# Patient Record
Sex: FEMALE | Race: WHITE | NOT HISPANIC OR LATINO | Employment: OTHER | URBAN - METROPOLITAN AREA
[De-identification: names, ages, dates, MRNs, and addresses within clinical notes are randomized per-mention and may not be internally consistent; named-entity substitution may affect disease eponyms.]

---

## 2021-02-15 ENCOUNTER — TRANSCRIBE ORDERS (OUTPATIENT)
Dept: ADMINISTRATIVE | Facility: HOSPITAL | Age: 71
End: 2021-02-15

## 2021-02-15 ENCOUNTER — APPOINTMENT (OUTPATIENT)
Dept: RADIOLOGY | Facility: CLINIC | Age: 71
End: 2021-02-15
Payer: MEDICARE

## 2021-02-15 DIAGNOSIS — M54.50 LOW BACK PAIN, UNSPECIFIED BACK PAIN LATERALITY, UNSPECIFIED CHRONICITY, UNSPECIFIED WHETHER SCIATICA PRESENT: ICD-10-CM

## 2021-02-15 DIAGNOSIS — M54.50 LOW BACK PAIN, UNSPECIFIED BACK PAIN LATERALITY, UNSPECIFIED CHRONICITY, UNSPECIFIED WHETHER SCIATICA PRESENT: Primary | ICD-10-CM

## 2021-02-15 PROCEDURE — 72100 X-RAY EXAM L-S SPINE 2/3 VWS: CPT

## 2021-02-23 ENCOUNTER — CONSULT (OUTPATIENT)
Dept: PAIN MEDICINE | Facility: CLINIC | Age: 71
End: 2021-02-23
Payer: MEDICARE

## 2021-02-23 VITALS
DIASTOLIC BLOOD PRESSURE: 80 MMHG | TEMPERATURE: 97.9 F | HEIGHT: 62 IN | BODY MASS INDEX: 21.97 KG/M2 | SYSTOLIC BLOOD PRESSURE: 136 MMHG | WEIGHT: 119.4 LBS | HEART RATE: 78 BPM

## 2021-02-23 DIAGNOSIS — Z98.890 STATUS POST LUMBAR LAMINECTOMY: ICD-10-CM

## 2021-02-23 DIAGNOSIS — M54.16 LUMBAR RADICULOPATHY: Primary | ICD-10-CM

## 2021-02-23 PROCEDURE — 99204 OFFICE O/P NEW MOD 45 MIN: CPT | Performed by: ANESTHESIOLOGY

## 2021-02-23 RX ORDER — CYCLOBENZAPRINE HCL 5 MG
5 TABLET ORAL 3 TIMES DAILY PRN
COMMUNITY
Start: 2021-02-15

## 2021-02-23 RX ORDER — GABAPENTIN 100 MG/1
CAPSULE ORAL
Qty: 90 CAPSULE | Refills: 0 | Status: SHIPPED | OUTPATIENT
Start: 2021-02-23 | End: 2021-02-23 | Stop reason: SDUPTHER

## 2021-02-23 RX ORDER — GABAPENTIN 100 MG/1
CAPSULE ORAL
Qty: 90 CAPSULE | Refills: 0 | Status: SHIPPED | OUTPATIENT
Start: 2021-02-23

## 2021-02-23 NOTE — PATIENT INSTRUCTIONS
Gabapentin (By mouth)   Gabapentin (jannette-a-PEN-tin)  Treats seizures and pain caused by shingles  Brand Name(s): FusePaq Fanatrex, Gralise, Neurontin   There may be other brand names for this medicine  When This Medicine Should Not Be Used: This medicine is not right for everyone  Do not use it if you had an allergic reaction to gabapentin  How to Use This Medicine:   Capsule, Liquid, Tablet  · Take your medicine as directed  Your dose may need to be changed several times to find what works best for you  If you have epilepsy, do not allow more than 12 hours to pass between doses  · Capsule: Swallow the capsule whole with plenty of water  Do not open, crush, or chew it  · Gralise® tablet: Swallow the tablet whole   Do not crush, break, or chew it  · Neurontin® tablet: If you break a tablet into 2 pieces, use the second half as your next dose  Do not use the half-tablet if the whole tablet has been cut or broken after 28 days  · Oral liquid: Measure the oral liquid medicine with a marked measuring spoon, oral syringe, or medicine cup  · This medicine should come with a Medication Guide  Ask your pharmacist for a copy if you do not have one  · Missed dose: Take a dose as soon as you remember  If it is almost time for your next dose, wait until then and take a regular dose  Do not take extra medicine to make up for a missed dose  · Store the medicine in a closed container at room temperature, away from heat, moisture, and direct light  Store the Neurontin® oral liquid in the refrigerator  Do not freeze  Drugs and Foods to Avoid:   Ask your doctor or pharmacist before using any other medicine, including over-the-counter medicines, vitamins, and herbal products  · Some medicines can affect how gabapentin works  Tell your doctor if you also using hydrocodone or morphine  · If you take an antacid, wait at least 2 hours before you take gabapentin    · Do not drink alcohol while you are using this medicine  · Tell your doctor if you use anything else that makes you sleepy  Some examples are allergy medicine, narcotic pain medicine, and alcohol  Tell your doctor if you are also using lorazepam, oxycodone, or zolpidem  Warnings While Using This Medicine:   · Tell your doctor if you are pregnant or breastfeeding, or if you have kidney problems (including patients receiving dialysis) or lung problems  Tell your doctor if you have a history of depression or mental health problems  · This medicine may cause the following problems:  ? Drug reaction with eosinophilia and systemic symptoms (DRESS) or multiorgan hypersensitivity, which may damage the liver, kidney, blood, heart, or muscles  ? Changes in mood or behavior, including suicidal thoughts or behavior  ? Respiratory depression (serious breathing problem that can be life-threatening), when used with narcotic pain medicines  · Do not stop using this medicine suddenly  Your doctor will need to slowly decrease your dose before you stop it completely  · This medicine may make you dizzy or drowsy  Do not drive or do anything else that could be dangerous until you know how this medicine affects you  · Tell any doctor or dentist who treats you that you are using this medicine  This medicine may affect certain medical test results  · Your doctor will check your progress and the effects of this medicine at regular visits  Keep all appointments  · Keep all medicine out of the reach of children  Never share your medicine with anyone    Possible Side Effects While Using This Medicine:   Call your doctor right away if you notice any of these side effects:  · Allergic reaction: Itching or hives, swelling in your face or hands, swelling or tingling in your mouth or throat, chest tightness, trouble breathing  · Behavior problems, aggression, restlessness, trouble concentrating, moodiness (especially in children)  · Blistering, peeling, red skin rash  · Blue lips, fingernails, or skin, chest pain, fast heartbeat, trouble breathing  · Change in how much or how often you urinate, bloody or cloudy urine  · Dark urine or pale stools, nausea, vomiting, loss of appetite, stomach pain, yellow skin or eyes  · Fever, chills, cough, sore throat, body aches  · Problems with coordination, shakiness, unsteadiness, unusual eye movement  · Rapid weight gain, swelling in your hands, ankles, or feet  · Rash, swollen or tender glands in the neck, armpit, or groin  · Unusual moods or behaviors, thoughts of hurting yourself, feeling depressed  If you notice these less serious side effects, talk with your doctor:   · Dizziness, drowsiness, sleepiness, tiredness  If you notice other side effects that you think are caused by this medicine, tell your doctor  Call your doctor for medical advice about side effects  You may report side effects to FDA at 0-232-FDA-7725  © Copyright [facility] [address] Information is for End User's use only and may not be sold, redistributed or otherwise used for commercial purposes  The above information is an  only  It is not intended as medical advice for individual conditions or treatments  Talk to your doctor, nurse or pharmacist before following any medical regimen to see if it is safe and effective for you

## 2021-02-23 NOTE — PROGRESS NOTES
Assessment  1  Lumbar radiculopathy - Right    2  Status post lumbar laminectomy        Plan       At this point the patient's pain persists despite time, relative rest, activity modification, and nonsteroidal anti-inflammatories  She has undergone physical therapy  Her pain is significantly interfering with her daily living activities  It is appropriate to order an MRI of the lumbar spine to rule out any significant etiology of her symptoms  As she has had previous surgery will order MRI with and without contrast and obtain renal blood work  I will start her on a titrating dose of Gabapentin to address any neuropathic component of the patient's pain  I did review the potential side effects of gabapentin, not limited to, but including dizziness, drowsiness, weakness, tired feeling, nausea, diarrhea, constipation, blurred vision, headache, swelling, dry mouth; or loss of balance or coordination  If she has any problems or questions she will give us a call  Once we obtain MRI results we will proceed from there  My impressions and treatment recommendations were discussed in detail with the patient who verbalized understanding and had no further questions  Discharge instructions were provided  I personally saw and examined the patient and I agree with the above discussed plan of care  This note is created using dictation transcription  It may contain typographical errors, grammatical errors, improperly dictated words, background noise and other errors  Orders Placed This Encounter   Procedures    MRI lumbar spine with and without contrast     Standing Status:   Future     Standing Expiration Date:   2/23/2025     Scheduling Instructions: There is no preparation for this test  Please leave your jewelry and valuables at home, wedding rings are the exception   Magnetic nail polish must be removed prior to arrival for your test  Please bring your insurance cards, a form of photo ID and a list of your medications with you  Arrive 15 minutes prior to your appointment time in order to register  Please bring any prior CT or MRI studies of this area that were not performed at a [facility]  To schedule this appointment, please contact Central Scheduling at [phone]  Prior to your appointment, please make sure you complete the MRI Screening Form when you e-Check in for your appointment  This will be available starting 7 days before your appointment in [facility]  You may receive an e-mail with an activation code if you do not have a [employer] account  If you do not have access to a device, we will complete your screening at your appointment  Order Specific Question:   What is the patient's sedation requirement? Answer:   No Sedation     Order Specific Question:   Release to patient through [employer]     Answer:   Immediate     Order Specific Question:   Is order priority selected as STAT? Answer:   No     Order Specific Question:   Reason for Exam (FREE TEXT)     Answer:   right lumbar radic    BUN     Standing Status:   Future     Standing Expiration Date:   2/23/2022    Creatinine, serum     Standing Status:   Future     Standing Expiration Date:   2/23/2022     New Medications Ordered This Visit   Medications    cyclobenzaprine (FLEXERIL) 5 mg tablet     Sig: Take 5 mg by mouth 3 (three) times a day as needed    gabapentin (NEURONTIN) 100 mg capsule     Sig: Start with 1 pill at bedtime for 5 days then 1 pill twice a day for 5 days then 1 pill 3 times a day     Dispense:  90 capsule     Refill:  0     Referred By: Self  History of Present Illness    Zena Ventura is a 72 y o  female   Her long history of low back and leg pain  Previously underwent lumbar laminectomy in 2012  She states for the past  2-3 months she is having severe right lower extremity pain has tried a course oral steroids but her pain persists    It is moderate to severe rates as 10/10 on the visual analog scale interfering with daily living activities  Pain is nearly constant worse in the morning and afternoon describes throbbing down the posterior lateral aspect of her right leg  Lying down relaxation decreases symptoms while walking aggravates it  In the past injectional therapy exercise in acupuncture provided no relief, physical therapy heat nice and chiropractic manipulation provided moderate relief  She denies any bowel or bladder dysfunction  I have personally reviewed and/or updated the patient's past medical history, past surgical history, family history, social history, current medications, allergies, and vital signs today  Review of Systems   Constitutional: Negative for fever and unexpected weight change  HENT: Negative for trouble swallowing  Eyes: Negative for visual disturbance  Respiratory: Negative for shortness of breath and wheezing  Cardiovascular: Negative for chest pain and palpitations  Gastrointestinal: Negative for constipation, diarrhea, nausea and vomiting  Endocrine: Negative for cold intolerance, heat intolerance and polydipsia  Genitourinary: Negative for difficulty urinating and frequency  Musculoskeletal: Positive for gait problem  Negative for arthralgias, joint swelling and myalgias  Skin: Negative for rash  Neurological: Negative for dizziness, seizures, syncope, weakness and headaches  Hematological: Does not bruise/bleed easily  Psychiatric/Behavioral: Negative for dysphoric mood  All other systems reviewed and are negative  There is no problem list on file for this patient  History reviewed  No pertinent past medical history  Past Surgical History:   Procedure Laterality Date    LAMINECTOMY      WRIST SURGERY         History reviewed  No pertinent family history      Social History     Occupational History    Not on file   Tobacco Use    Smoking status: Never Smoker    Smokeless tobacco: Never Used   Substance and Sexual Activity    Alcohol use: Not Currently    Drug use: Not Currently    Sexual activity: Not Currently       Current Outpatient Medications on File Prior to Visit   Medication Sig    cyclobenzaprine (FLEXERIL) 5 mg tablet Take 5 mg by mouth 3 (three) times a day as needed     No current facility-administered medications on file prior to visit  No Known Allergies    Physical Exam    /80 (BP Location: Left arm, Patient Position: Sitting, Cuff Size: Standard)   Pulse 78   Temp 97 9 °F (36 6 °C)   Ht 5' 2" (1 575 m)   Wt 54 2 kg (119 lb 6 4 oz)   BMI 21 84 kg/m²     Constitutional: normal, well developed, well nourished, alert, in no distress and non-toxic and no overt pain behavior  Eyes: anicteric  HEENT: grossly intact  Neck: supple, symmetric, trachea midline and no masses   Pulmonary:even and unlabored  Cardiovascular:No edema or pitting edema present  Skin:Normal without rashes or lesions and well hydrated  Psychiatric:Mood and affect appropriate  Neurologic:Cranial Nerves II-XII grossly intact  Musculoskeletal:normal,   Difficulty going from sitting to standing to sitting position, no obvious skin lesions or erythema lumbar sacral spine with well-healed surgical scar, no tenderness to palpation lumbar sacral spine spinous process sacroiliac joint or greater trochanter bilateral   Deep tendon reflexes are symmetrical bilateral patella and Achilles, decreased sensation right L5 distribution to pinwheel, no focal motor deficit appreciated lower limbs, positive straight leg raising on the right negative on the left      Imaging  LUMBAR SPINE @  2-15-21   INDICATION:   M54 5: Low back pain      COMPARISON:  None     VIEWS:  XR SPINE LUMBAR 2 OR 3 VIEWS INJURY        FINDINGS:     There are 5 non rib bearing lumbar vertebral bodies       There is no evidence of acute fracture or destructive osseous lesion      The frontal image demonstrates a dextroscoliosis of the lumbar spine with apex at L2   The curvature is 13 degrees  On the lateral image there is grade 1 retrolisthesis L2 on L3 and L3 on L4 and L4 on L5       There is severe chronic disc degeneration at L2-L3, L3-L4, L4-L5, and at least moderate disc degeneration at L5-S1  There is also moderate disc degeneration at L1-L2  There is prominent osteophytic bone spur formation at the vertebral margins, mostly   directed ventrally  Probable chronic Schmorl's nodes at  superior endplate L2 and L3      The pedicles appear intact      Soft tissues are unremarkable      IMPRESSION:     Scoliosis, severe degenerative changes of lumbar discs, and degenerative retrolistheses as mentioned above  I have personally reviewed pertinent films in PACS

## 2021-03-04 DIAGNOSIS — Z23 ENCOUNTER FOR IMMUNIZATION: ICD-10-CM

## 2023-03-16 NOTE — PROGRESS NOTES
Patient ID: Daisy Freed is a 71 y o  female Date of Birth 1950       Chief Complaint   Patient presents with   • Foot Injury     Right foot     • Foot Pain     Right              Diagnosis:  1  Metatarsal stress fracture, right, initial encounter  -     XR foot 3+ vw right; Future; Expected date: 03/17/2023    2  Metatarsalgia of right foot  -     lidocaine (XYLOCAINE) 1 % injection 0 5 mL  -     dexamethasone (DECADRON) injection 4 mg  -     triamcinolone acetonide (KENALOG-40) 40 mg/mL injection 40 mg  -     Cam Boot    3  Hallux limitus, right         1  Initial pedal examination with socks and shoes removed bilaterally  2  I personally reviewed patient's MRI images, MRI report from 2/3/2023 at [facility], images were loaded into [employer] system  I personally reviewed patient's x-rays from this morning, final radiology read is pending, no osseous abnormalities noted  3   At this time, her MRI is from February, she has been dealing with symptoms since the fall, we discussed the basic etiology of this pain is from hallux limitus and transfer pain to lateral foot  We discussed biomechanics, etiology and treatment options  As this is a chronic problem, no osseous abnormalities noted on x-ray we have opted for injection to right foot, please see procedure note  Patient was placed in a low tide Cam boot, she will wear this at all times except when she is sleeping, showering and she is to wear a stiff soled sneaker to drive  4   We discussed long-term shoe gear management, she is advised to make sure that she wears only stiff soled sneakers and shoes, she is a , I have advised her to modify her activity to make sure she is not hyperextending her great toe joint when she is working in the yard  5   Patient may continue ice, compression as needed  6   As patient has history of stress fracture, I have asked her to add vitamin D and calcium supplements to her diet daily    7   Total time spent examining patient, reviewing MRI results, images, x-ray, educating patient, documentation of visit, not including procedure time is 48 minutes  8  She will follow-up in 2 weeks  She understands and agrees with the plan  Small joint arthrocentesis: R fourth MTP  Universal Protocol:  Consent: Verbal consent obtained  Risks and benefits: risks, benefits and alternatives were discussed  Consent given by: patient  Time out: Immediately prior to procedure a "time out" was called to verify the correct patient, procedure, equipment, support staff and site/side marked as required  Patient understanding: patient states understanding of the procedure being performed  Patient identity confirmed: verbally with patient    Supporting Documentation  Indications: joint swelling and pain   Procedure Details  Location: fourth toe - R fourth MTP  Preparation: Patient was prepped and draped in the usual sterile fashion  Ultrasound guidance: no  Approach: dorsal    Patient tolerance: patient tolerated the procedure well with no immediate complications  Dressing:  Sterile dressing applied    A 1 :1:1  mixture of half cc each 1% plain lidocaine, Decadron 4 mg/mL and Kenalog 40 was injected to the fourth MTPJ right foot           Subjective:   Patient presents today for right foot pain, she states she has been suffering with this for approximately 6 months, initially she was treated with a cam boot but she was unable to wear this secondary to causing hip and back pain secondary to uneven walking surfaces  She was then subsequently placed in a surgical shoe which she has been in for quite a few months  She has had an MRI showing stress fracture but she does not feel like she is improving at all  She presents today for second opinion and seeking further care  The following portions of the patient's history were reviewed and updated as appropriate:     History reviewed  No pertinent past medical history      Past Surgical History:   Procedure Laterality Date   • LAMINECTOMY     • WRIST SURGERY         Social History     Socioeconomic History   • Marital status: /Civil Union     Spouse name: None   • Number of children: None   • Years of education: None   • Highest education level: None   Occupational History   • None   Tobacco Use   • Smoking status: Never   • Smokeless tobacco: Never   Vaping Use   • Vaping Use: Never used   Substance and Sexual Activity   • Alcohol use: Not Currently   • Drug use: Not Currently   • Sexual activity: Not Currently   Other Topics Concern   • None   Social History Narrative   • None     Social Determinants of Health     Financial Resource Strain: Not on file   Food Insecurity: Not on file   Transportation Needs: Not on file   Physical Activity: Not on file   Stress: Not on file   Social Connections: Not on file   Intimate Partner Violence: Not on file   Housing Stability: Not on file          Current Outpatient Medications:   •  cyclobenzaprine (FLEXERIL) 5 mg tablet, Take 5 mg by mouth 3 (three) times a day as needed (Patient not taking: Reported on 3/17/2023), Disp: , Rfl:   •  gabapentin (NEURONTIN) 100 mg capsule, Start with 1 pill at bedtime for 5 days then 1 pill twice a day for 5 days then 1 pill 3 times a day (Patient not taking: Reported on 3/17/2023), Disp: 90 capsule, Rfl: 0  No current facility-administered medications for this visit  Allergies  Patient has no known allergies  History reviewed  No pertinent family history  Objective:  /79 (BP Location: Left arm, Patient Position: Sitting, Cuff Size: Adult)   Pulse 57   Ht 5' 2" (1 575 m) Comment: verbal  Wt 49 9 kg (110 lb) Comment: verbal  BMI 20 12 kg/m²     Review of Systems   Constitutional: Negative for chills and fever  HENT: Negative for ear pain and sore throat  Eyes: Negative for pain and visual disturbance  Respiratory: Negative for cough and shortness of breath      Cardiovascular: Negative for chest pain and palpitations  Gastrointestinal: Negative for abdominal pain and vomiting  Genitourinary: Negative for dysuria and hematuria  Musculoskeletal: Negative for arthralgias and back pain  Right foot pain   Skin: Negative for color change and rash  Neurological: Negative for seizures and syncope  All other systems reviewed and are negative  Physical Exam  Constitutional:       Appearance: Normal appearance  She is well-developed and normal weight  HENT:      Head: Normocephalic and atraumatic  Nose: Nose normal       Mouth/Throat:      Mouth: Mucous membranes are moist       Pharynx: Oropharynx is clear  Eyes:      Conjunctiva/sclera: Conjunctivae normal    Cardiovascular:      Pulses:           Dorsalis pedis pulses are 2+ on the right side and 2+ on the left side  Posterior tibial pulses are 2+ on the right side and 2+ on the left side  Pulmonary:      Effort: Pulmonary effort is normal    Musculoskeletal:         General: Normal range of motion  Cervical back: Normal range of motion  Right lower leg: No edema  Left lower leg: No edema  Feet:      Right foot:      Protective Sensation: 10 sites tested  10 sites sensed  Skin integrity: Skin integrity normal       Toenail Condition: Right toenails are normal       Left foot:      Protective Sensation: 10 sites tested  Skin integrity: Skin integrity normal       Toenail Condition: Left toenails are normal       Comments: 1  Hallux limitus right foot with transfer pain to metatarsals 2, 3, 4, positive pain on palpation fourth MTPJ, pain on dorsal palpation of third and fourth metatarsals  No pain on plantar palpation, no pain on range of motion of third and fourth toes  2   Fat pad atrophy submetatarsal heads 1 through 5 bilateral  3  MMT 5 out of 5 bilateral  4  Active and passive range of motion is within normal limits  Skin:     General: Skin is warm and dry        Capillary Refill: Capillary refill takes less than 2 seconds  Neurological:      General: No focal deficit present  Mental Status: She is alert and oriented to person, place, and time  Mental status is at baseline  Psychiatric:         Mood and Affect: Mood normal          Behavior: Behavior normal          Thought Content: Thought content normal          Judgment: Judgment normal                     No pertinent results found  Masha Montemayor DPM, DPM, FACFAS    Portions of the record may have been created with voice recognition software  Occasional wrong word or "sound a like" substitutions may have occurred due to the inherent limitations of voice recognition software  Read the chart carefully and recognize, using context, where substitutions have occurred

## 2023-03-17 ENCOUNTER — OFFICE VISIT (OUTPATIENT)
Dept: PODIATRY | Facility: CLINIC | Age: 73
End: 2023-03-17

## 2023-03-17 ENCOUNTER — APPOINTMENT (OUTPATIENT)
Dept: RADIOLOGY | Facility: CLINIC | Age: 73
End: 2023-03-17

## 2023-03-17 VITALS
SYSTOLIC BLOOD PRESSURE: 119 MMHG | DIASTOLIC BLOOD PRESSURE: 79 MMHG | HEART RATE: 57 BPM | WEIGHT: 110 LBS | BODY MASS INDEX: 20.24 KG/M2 | HEIGHT: 62 IN

## 2023-03-17 DIAGNOSIS — M84.374A METATARSAL STRESS FRACTURE, RIGHT, INITIAL ENCOUNTER: ICD-10-CM

## 2023-03-17 DIAGNOSIS — M77.41 METATARSALGIA OF RIGHT FOOT: ICD-10-CM

## 2023-03-17 DIAGNOSIS — M84.374A METATARSAL STRESS FRACTURE, RIGHT, INITIAL ENCOUNTER: Primary | ICD-10-CM

## 2023-03-17 DIAGNOSIS — M20.5X1 HALLUX LIMITUS, RIGHT: ICD-10-CM

## 2023-03-17 RX ORDER — LIDOCAINE HYDROCHLORIDE 10 MG/ML
0.5 INJECTION, SOLUTION INFILTRATION; PERINEURAL ONCE
Status: COMPLETED | OUTPATIENT
Start: 2023-03-17 | End: 2023-03-17

## 2023-03-17 RX ORDER — DEXAMETHASONE SODIUM PHOSPHATE 4 MG/ML
4 INJECTION, SOLUTION INTRA-ARTICULAR; INTRALESIONAL; INTRAMUSCULAR; INTRAVENOUS; SOFT TISSUE ONCE
Status: COMPLETED | OUTPATIENT
Start: 2023-03-17 | End: 2023-03-17

## 2023-03-17 RX ORDER — TRIAMCINOLONE ACETONIDE 40 MG/ML
40 INJECTION, SUSPENSION INTRA-ARTICULAR; INTRAMUSCULAR ONCE
Status: COMPLETED | OUTPATIENT
Start: 2023-03-17 | End: 2023-03-17

## 2023-03-17 RX ADMIN — TRIAMCINOLONE ACETONIDE 40 MG: 40 INJECTION, SUSPENSION INTRA-ARTICULAR; INTRAMUSCULAR at 10:13

## 2023-03-17 RX ADMIN — DEXAMETHASONE SODIUM PHOSPHATE 4 MG: 4 INJECTION, SOLUTION INTRA-ARTICULAR; INTRALESIONAL; INTRAMUSCULAR; INTRAVENOUS; SOFT TISSUE at 10:13

## 2023-03-17 RX ADMIN — LIDOCAINE HYDROCHLORIDE 0.5 ML: 10 INJECTION, SOLUTION INFILTRATION; PERINEURAL at 10:13

## 2023-03-31 ENCOUNTER — OFFICE VISIT (OUTPATIENT)
Dept: PODIATRY | Facility: CLINIC | Age: 73
End: 2023-03-31

## 2023-03-31 VITALS
BODY MASS INDEX: 20.06 KG/M2 | DIASTOLIC BLOOD PRESSURE: 72 MMHG | WEIGHT: 109 LBS | SYSTOLIC BLOOD PRESSURE: 117 MMHG | HEART RATE: 73 BPM | HEIGHT: 62 IN

## 2023-03-31 DIAGNOSIS — M84.374A METATARSAL STRESS FRACTURE, RIGHT, INITIAL ENCOUNTER: Primary | ICD-10-CM

## 2023-03-31 DIAGNOSIS — M20.5X1 HALLUX LIMITUS, RIGHT: ICD-10-CM

## 2023-03-31 DIAGNOSIS — M77.41 METATARSALGIA OF RIGHT FOOT: ICD-10-CM

## 2023-03-31 NOTE — PROGRESS NOTES
Patient ID: Estefania Saez is a 71 y o  female Date of Birth 1950       Chief Complaint   Patient presents with   • Foot Injury     Right foot 2 weeks f/u             Diagnosis:  1  Metatarsal stress fracture, right, initial encounter    2  Metatarsalgia of right foot    3  Hallux limitus, right      1  Pedal examination with socks and shoes removed bilaterally  2   Patient with significant decrease in swelling and pain and tenderness to the right foot, this time she is to transition to her sneakers, 1 hour in sneaker then back in boot, 2 hours in sneaker then back in boot and gradually increase 1 hour a day  3   I added metatarsal pad right to right shoe insert to the plantar surface, she states she feels a support just behind the ball of the foot where it should be  I advised if she feels discomfort or does not like it she may remove and discard  4   We discussed long-term shoe gear modification with removal of all flexible flat shoes, she has been wearing [employer]ko clogs, I advised I recommend them but she has to go through a gradual break-in process, any shoe that has a roll, a Birkenstock, platform type shoes appropriate  5   We discussed custom molded orthotics, at this time she defers but will keep on her radar as they are not covered by insurance  6   Again I reviewed with patient when she is gardening to make sure she is not in a squatting position or putting extra pressure through hyperextended toes, she is to sit on either a guarding stool or on the ground, no kneeling  7   Patient understands and agrees with the plan and will follow-up in 4 to 6 weeks  Subjective:   Patient presents today for care of right foot pain and history of stress fracture, she states the injection helped very much, swelling has subsided, she is wearing sneakers today and states there is some pain on the long the bottom of her foot but this might be just from transitioning from cam boot to sneaker          The "following portions of the patient's history were reviewed and updated as appropriate: allergies, current medications, past family history, past medical history, past social history, past surgical history and problem list         Objective:  /72 (BP Location: Right arm, Patient Position: Sitting, Cuff Size: Adult)   Pulse 73   Ht 5' 2\" (1 575 m) Comment: verbal  Wt 49 4 kg (109 lb)   BMI 19 94 kg/m²     Review of Systems   Constitutional: Negative for chills and fever  HENT: Negative for ear pain and sore throat  Eyes: Negative for pain and visual disturbance  Respiratory: Negative for cough and shortness of breath  Cardiovascular: Negative for chest pain and palpitations  Gastrointestinal: Negative for abdominal pain and vomiting  Genitourinary: Negative for dysuria and hematuria  Musculoskeletal: Negative for arthralgias and back pain  Pain in right foot   Skin: Negative for color change and rash  Neurological: Negative for seizures and syncope  All other systems reviewed and are negative  Physical Exam  Constitutional:       Appearance: Normal appearance  She is well-developed and normal weight  HENT:      Head: Normocephalic and atraumatic  Nose: Nose normal       Mouth/Throat:      Mouth: Mucous membranes are moist       Pharynx: Oropharynx is clear  Eyes:      Conjunctiva/sclera: Conjunctivae normal    Cardiovascular:      Pulses:           Dorsalis pedis pulses are 2+ on the right side and 2+ on the left side  Posterior tibial pulses are 2+ on the right side and 2+ on the left side  Pulmonary:      Effort: Pulmonary effort is normal    Musculoskeletal:         General: Normal range of motion  Cervical back: Normal range of motion  Right lower leg: No edema  Left lower leg: No edema  Feet:      Right foot:      Protective Sensation: 10 sites tested  10 sites sensed        Skin integrity: Skin integrity normal       Toenail Condition: " "Right toenails are normal       Left foot:      Protective Sensation: 10 sites tested  10 sites sensed  Toenail Condition: Left toenails are normal       Comments: 1  Hallux limitus right foot with transfer pain to metatarsals 2, 3, 4,and have given decrease in pain on palpation fourth MTPJ, no  pain on dorsal palpation of third and fourth metatarsals  No pain on plantar palpation, no pain on range of motion of third and fourth toes  2   Fat pad atrophy submetatarsal heads 1 through 5 bilateral  3  MMT 5 out of 5 bilateral  4  Active and passive range of motion is within normal limits  Skin:     General: Skin is warm and dry  Neurological:      Mental Status: She is alert and oriented to person, place, and time  Psychiatric:         Behavior: Behavior normal          Thought Content: Thought content normal          Judgment: Judgment normal               XR foot 3+ vw right    Result Date: 3/23/2023  Narrative: RIGHT FOOT INDICATION:   M84 [id number]: Stress fracture, right foot, initial encounter for fracture  COMPARISON:  None VIEWS:  XR FOOT 3+ VW RIGHT Images: 3 FINDINGS: There is no acute fracture or dislocation  No significant degenerative changes  No lytic or blastic osseous lesion  Soft tissues are unremarkable  Impression: No acute osseous abnormality  Workstation performed: [id number]             No pertinent results found  Jeane Donald, DPM, DPM, FACFAS    Portions of the record may have been created with voice recognition software  Occasional wrong word or \"sound a like\" substitutions may have occurred due to the inherent limitations of voice recognition software  Read the chart carefully and recognize, using context, where substitutions have occurred    "

## 2023-05-05 ENCOUNTER — OFFICE VISIT (OUTPATIENT)
Dept: PODIATRY | Facility: CLINIC | Age: 73
End: 2023-05-05

## 2023-05-05 VITALS
BODY MASS INDEX: 20.06 KG/M2 | WEIGHT: 109 LBS | DIASTOLIC BLOOD PRESSURE: 73 MMHG | HEART RATE: 70 BPM | SYSTOLIC BLOOD PRESSURE: 120 MMHG | HEIGHT: 62 IN

## 2023-05-05 DIAGNOSIS — M20.5X1 HALLUX LIMITUS, RIGHT: ICD-10-CM

## 2023-05-05 DIAGNOSIS — M84.374A METATARSAL STRESS FRACTURE, RIGHT, INITIAL ENCOUNTER: Primary | ICD-10-CM

## 2023-05-05 DIAGNOSIS — M77.41 METATARSALGIA OF RIGHT FOOT: ICD-10-CM

## 2023-05-05 NOTE — PROGRESS NOTES
"Patient ID: Jose Isbell is a 71 y o  female Date of Birth 1950       Chief Complaint   Patient presents with    Foot Injury     Right foot 5 week FU             Diagnosis:  1  Metatarsal stress fracture, right, initial encounter    2  Metatarsalgia of right foot  -     Diclofenac Sodium (VOLTAREN) 1 %; Apply 2 g topically 4 (four) times a day    3  Hallux limitus, right  -     Diclofenac Sodium (VOLTAREN) 1 %; Apply 2 g topically 4 (four) times a day      1  Pedal examination with socks and shoes removed bilaterally  2   Patient to continue with stiff soled shoes, I have asked her again to avoid anything flat/flexible, she presents today wearing new balance hiking boots but she is going on vacation and would like something dressier, I have advised her to look at doing platform, wedge, black plain running sneakers with a stiff sole as opposed to her sketchers which are quite flexible  3   Patient was given a prescription for diclofenac sodium gel, she may apply this 4 times a day to the affected area  4   At this time patient will follow-up as needed  2  Subjective:   Patient presents today for follow-up of right foot pain, she states she is doing better no longer has pain in her third metatarsal and her toe but is sometimes having pain on the outside of her foot  She states most of the time she wears her hiking type shoes but sometimes when she wears flexible shoes she has more pain and discomfort  The following portions of the patient's history were reviewed and updated as appropriate: allergies, current medications, past family history, past medical history, past social history, past surgical history and problem list         Objective:  /73 (BP Location: Left arm, Patient Position: Sitting, Cuff Size: Adult)   Pulse 70   Ht 5' 2\" (1 575 m) Comment: verbal  Wt 49 4 kg (109 lb)   BMI 19 94 kg/m²     Review of Systems   Constitutional: Negative for chills and fever     HENT: " Negative for ear pain and sore throat  Eyes: Negative for pain and visual disturbance  Respiratory: Negative for cough and shortness of breath  Cardiovascular: Negative for chest pain and palpitations  Gastrointestinal: Negative for abdominal pain and vomiting  Genitourinary: Negative for dysuria and hematuria  Musculoskeletal: Negative for arthralgias and back pain  Right foot pain   Skin: Negative for color change and rash  Neurological: Negative for seizures and syncope  All other systems reviewed and are negative  Physical Exam  Constitutional:       Appearance: Normal appearance  She is well-developed and normal weight  HENT:      Head: Normocephalic and atraumatic  Nose: Nose normal       Mouth/Throat:      Mouth: Mucous membranes are moist       Pharynx: Oropharynx is clear  Eyes:      Conjunctiva/sclera: Conjunctivae normal    Cardiovascular:      Pulses:           Dorsalis pedis pulses are 2+ on the right side and 2+ on the left side  Posterior tibial pulses are 2+ on the right side and 2+ on the left side  Pulmonary:      Effort: Pulmonary effort is normal    Musculoskeletal:         General: Normal range of motion  Cervical back: Normal range of motion  Right lower leg: No edema  Left lower leg: No edema  Feet:      Right foot:      Protective Sensation: 10 sites tested  10 sites sensed  Skin integrity: Skin integrity normal       Toenail Condition: Right toenails are normal       Left foot:      Protective Sensation: 10 sites tested  10 sites sensed  Skin integrity: Skin integrity normal       Toenail Condition: Left toenails are normal       Comments: 1  Hallux limitus right foot with transfer pain to metatarsals 2, 3, 4,and have given decrease in pain on palpation fourth MTPJ, no  pain on dorsal palpation of third and fourth metatarsals  No pain on plantar palpation, no pain on range of motion of third and fourth toes    2  "Fat pad atrophy submetatarsal heads 1 through 5 bilateral  3  MMT 5 out of 5 bilateral  4  Active and passive range of motion is within normal limits  Skin:     General: Skin is warm and dry  Capillary Refill: Capillary refill takes less than 2 seconds  Neurological:      General: No focal deficit present  Mental Status: She is alert and oriented to person, place, and time  Mental status is at baseline  Psychiatric:         Mood and Affect: Mood normal          Behavior: Behavior normal          Thought Content: Thought content normal          Judgment: Judgment normal               No results found  No pertinent results found  Dariela Ayala DPM, DPM, FACFAS    Portions of the record may have been created with voice recognition software  Occasional wrong word or \"sound a like\" substitutions may have occurred due to the inherent limitations of voice recognition software  Read the chart carefully and recognize, using context, where substitutions have occurred    "

## 2023-05-12 ENCOUNTER — TELEPHONE (OUTPATIENT)
Dept: OTHER | Facility: OTHER | Age: 73
End: 2023-05-12

## 2023-05-12 ENCOUNTER — TELEPHONE (OUTPATIENT)
Dept: PODIATRY | Facility: CLINIC | Age: 73
End: 2023-05-12

## 2023-05-12 NOTE — TELEPHONE ENCOUNTER
MARIVEL Marion about her concern for the Voltaren, we discussed what Dr Emre Geronimo said about stop the medication and If is not getting any better then she should go to her PCP  She stated that she stopped the medication yesterday and she took a Benadryl this morning but her eyes are  still swollen  Patient verbalized understanding and appreciation

## 2023-05-12 NOTE — TELEPHONE ENCOUNTER
Pt just started the Voltaren gel on her feet and she wants to know if it can cause a reaction to her eyes  Both eyes are swollen and she has not touch them  She would like to know what to do  Pt has taken benadryl and there is no change

## 2025-08-04 ENCOUNTER — HOSPITAL ENCOUNTER (OUTPATIENT)
Age: 75
Discharge: HOME | End: 2025-08-04
Attending: FAMILY MEDICINE
Payer: COMMERCIAL

## 2025-08-04 VITALS — WEIGHT: 115 LBS | BODY MASS INDEX: 20.38 KG/M2 | HEIGHT: 63 IN

## 2025-08-04 DIAGNOSIS — Z12.31 ENCOUNTER FOR SCREENING MAMMOGRAM FOR MALIGNANT NEOPLASM OF BREAST: ICD-10-CM

## 2025-08-04 PROCEDURE — 77067 SCR MAMMO BI INCL CAD: CPT

## 2025-08-04 PROCEDURE — 77063 BREAST TOMOSYNTHESIS BI: CPT
